# Patient Record
Sex: FEMALE | Race: WHITE | NOT HISPANIC OR LATINO | Employment: OTHER | URBAN - METROPOLITAN AREA
[De-identification: names, ages, dates, MRNs, and addresses within clinical notes are randomized per-mention and may not be internally consistent; named-entity substitution may affect disease eponyms.]

---

## 2022-09-06 LAB — CRC RECOMMENDATION EXT: NORMAL

## 2023-09-06 ENCOUNTER — DOCUMENTATION ONLY (OUTPATIENT)
Dept: FAMILY MEDICINE | Facility: CLINIC | Age: 63
End: 2023-09-06

## 2023-09-06 LAB
CHOLESTEROL, TOTAL: 181
HDLC SERPL-MCNC: 62 MG/DL
LDLC SERPL CALC-MCNC: 96 MG/DL (ref 0–160)
TRIGL SERPL-MCNC: 130 MG/DL (ref 40–160)
VLDLC SERPL-MCNC: 23 MG/DL

## 2023-10-31 ENCOUNTER — TELEPHONE (OUTPATIENT)
Dept: FAMILY MEDICINE | Facility: CLINIC | Age: 63
End: 2023-10-31
Payer: COMMERCIAL

## 2023-10-31 NOTE — TELEPHONE ENCOUNTER
Informed patient of lab results and when her next lab work and visit with the doctor is. Patient verbalized understanding.

## 2023-11-11 ENCOUNTER — DOCUMENTATION ONLY (OUTPATIENT)
Dept: PRIMARY CARE CLINIC | Facility: CLINIC | Age: 63
End: 2023-11-11
Payer: COMMERCIAL

## 2024-01-23 LAB — BCS RECOMMENDATION EXT: NORMAL

## 2024-06-12 ENCOUNTER — TELEPHONE (OUTPATIENT)
Dept: FAMILY MEDICINE | Facility: CLINIC | Age: 64
End: 2024-06-12
Payer: COMMERCIAL

## 2024-06-12 RX ORDER — CALCIUM CARBONATE 500(1250)
1 TABLET ORAL 2 TIMES DAILY
COMMUNITY

## 2024-06-12 RX ORDER — ROSUVASTATIN CALCIUM 20 MG/1
20 TABLET, COATED ORAL NIGHTLY
COMMUNITY
Start: 2024-04-05

## 2024-06-12 NOTE — TELEPHONE ENCOUNTER
Patient notified to stop her Vit D due to elevated level on her lab results. Instructed to keep her appointment on 6/18/2024 @ 1:40 . Patient verbalized an understanding.

## 2024-06-12 NOTE — TELEPHONE ENCOUNTER
----- Message from Gordo Ibanez MD sent at 6/12/2024  8:32 AM CDT -----   Very elevated vitamin-D.  Please ask patient to stop supplementing vitamin-D if she is doing so.  Otherwise, no acute concerns.  We will  discuss lab results at upcoming appointment

## 2024-06-18 ENCOUNTER — OFFICE VISIT (OUTPATIENT)
Dept: FAMILY MEDICINE | Facility: CLINIC | Age: 64
End: 2024-06-18
Payer: COMMERCIAL

## 2024-06-18 VITALS
RESPIRATION RATE: 18 BRPM | SYSTOLIC BLOOD PRESSURE: 132 MMHG | DIASTOLIC BLOOD PRESSURE: 82 MMHG | TEMPERATURE: 98 F | OXYGEN SATURATION: 97 % | WEIGHT: 165.81 LBS

## 2024-06-18 DIAGNOSIS — Z13.6 ENCOUNTER FOR SCREENING FOR CARDIOVASCULAR DISORDERS: ICD-10-CM

## 2024-06-18 DIAGNOSIS — Z13.89 SCREENING FOR CARDIOVASCULAR, RESPIRATORY, AND GENITOURINARY DISEASES: ICD-10-CM

## 2024-06-18 DIAGNOSIS — M81.0 OSTEOPOROSIS, UNSPECIFIED OSTEOPOROSIS TYPE, UNSPECIFIED PATHOLOGICAL FRACTURE PRESENCE: ICD-10-CM

## 2024-06-18 DIAGNOSIS — Z78.0 POST-MENOPAUSAL: Primary | ICD-10-CM

## 2024-06-18 DIAGNOSIS — Z79.899 ENCOUNTER FOR LONG-TERM CURRENT USE OF MEDICATION: ICD-10-CM

## 2024-06-18 DIAGNOSIS — E78.5 HYPERLIPIDEMIA, UNSPECIFIED HYPERLIPIDEMIA TYPE: ICD-10-CM

## 2024-06-18 DIAGNOSIS — Z13.83 SCREENING FOR CARDIOVASCULAR, RESPIRATORY, AND GENITOURINARY DISEASES: ICD-10-CM

## 2024-06-18 DIAGNOSIS — Z13.6 SCREENING FOR CARDIOVASCULAR, RESPIRATORY, AND GENITOURINARY DISEASES: ICD-10-CM

## 2024-06-18 DIAGNOSIS — E78.2 MIXED HYPERLIPIDEMIA: ICD-10-CM

## 2024-06-18 DIAGNOSIS — E55.9 VITAMIN D DEFICIENCY: ICD-10-CM

## 2024-06-18 PROCEDURE — 99214 OFFICE O/P EST MOD 30 MIN: CPT | Mod: ,,,

## 2024-06-18 PROCEDURE — 3075F SYST BP GE 130 - 139MM HG: CPT | Mod: CPTII,,,

## 2024-06-18 PROCEDURE — 1159F MED LIST DOCD IN RCRD: CPT | Mod: CPTII,,,

## 2024-06-18 PROCEDURE — 3079F DIAST BP 80-89 MM HG: CPT | Mod: CPTII,,,

## 2024-06-18 PROCEDURE — 1160F RVW MEDS BY RX/DR IN RCRD: CPT | Mod: CPTII,,,

## 2024-06-18 RX ORDER — DENOSUMAB 60 MG/ML
60 INJECTION SUBCUTANEOUS
COMMUNITY

## 2024-06-18 NOTE — ASSESSMENT & PLAN NOTE
-stable   -currently rosuvastatin 20 mg daily, continue   -does have extensive family history, order  CT calcium score   -low-cholesterol diet

## 2024-06-18 NOTE — PROGRESS NOTES
Patient ID: Rica Bush is a 63 y.o. female.    Chief Complaint: Results      Rica Bush is a 63 y.o. female, known to Dr. Olivera, presents today for a routine follow-up visit.  Medical comorbidities include hyperlipidemia.  Since last visit patient reports he has been fairly well without acute injury or major illness.  Currently on rosuvastatin for hyperlipidemia and currently well managed.Age-appropriate screenings age-appropriate screenings reviewed, due for mammogram and shingles vaccine.  Otherwise feeling generally well without acute complaints today.    DR constance lees    MEDICAL HISTORY:    Past Medical History:   Diagnosis Date    Elevated blood pressure reading     Elevated LFTs     Mixed hyperlipidemia     Osteopenia     Improved with Prolia 2023    Osteoporosis     Lumbar spine 2019    Right wrist fracture     Vitamin D deficiency       Past Surgical History:   Procedure Laterality Date    OVARIAN CYST REMOVAL        Social History     Tobacco Use    Smoking status: Never    Smokeless tobacco: Never   Substance Use Topics    Drug use: Never          Health Maintenance Due   Topic Date Due    Hepatitis C Screening  Never done    HIV Screening  Never done    TETANUS VACCINE  Never done    Mammogram  Never done    Shingles Vaccine (1 of 2) Never done    RSV Vaccine (Age 60+ and Pregnant patients) (1 - 1-dose 60+ series) Never done    COVID-19 Vaccine (3 - 2023-24 season) 09/01/2023          Patient Care Team:  Galileo Olivera Sr., MD as PCP - General (Family Medicine)  Nelson Nelson MD (Hematology and Oncology)  Radha Lees MD (Obstetrics and Gynecology)      Review of Systems   Constitutional:  Negative for activity change, fatigue, fever and unexpected weight change.   HENT:  Negative for congestion, hearing loss, rhinorrhea, sore throat and trouble swallowing.    Eyes:  Negative for discharge, redness and visual disturbance.   Respiratory:  Negative for cough, chest tightness,  shortness of breath and wheezing.    Cardiovascular:  Negative for chest pain and palpitations.   Gastrointestinal:  Negative for abdominal pain, blood in stool, constipation, diarrhea, nausea and vomiting.   Endocrine: Negative for polydipsia and polyuria.   Genitourinary:  Negative for difficulty urinating, dysuria, flank pain, frequency, hematuria, menstrual problem and urgency.   Musculoskeletal:  Negative for arthralgias, gait problem, joint swelling, myalgias and neck pain.   Skin:  Negative for rash and wound.   Neurological:  Negative for facial asymmetry, speech difficulty, weakness and headaches.   Psychiatric/Behavioral:  Negative for confusion and dysphoric mood.    All other systems reviewed and are negative.      Objective:   /82 (BP Location: Left arm, Patient Position: Sitting, BP Method: Medium (Manual))   Temp 98 °F (36.7 °C) (Temporal)   Resp 18   Wt 75.2 kg (165 lb 12.8 oz)   SpO2 97%      Physical Exam  Constitutional:       General: She is not in acute distress.     Appearance: Normal appearance.   HENT:      Right Ear: Tympanic membrane, ear canal and external ear normal.      Left Ear: Tympanic membrane, ear canal and external ear normal.      Nose: Nose normal.      Mouth/Throat:      Mouth: Mucous membranes are moist.      Pharynx: Oropharynx is clear.   Eyes:      Extraocular Movements: Extraocular movements intact.      Conjunctiva/sclera: Conjunctivae normal.      Pupils: Pupils are equal, round, and reactive to light.   Cardiovascular:      Rate and Rhythm: Normal rate and regular rhythm.      Pulses: Normal pulses.      Heart sounds: Normal heart sounds. No murmur heard.     No gallop.   Pulmonary:      Effort: Pulmonary effort is normal.      Breath sounds: Normal breath sounds. No wheezing.   Abdominal:      General: Bowel sounds are normal. There is no distension.      Palpations: Abdomen is soft. There is no mass.      Tenderness: There is no abdominal tenderness. There  is no guarding.   Musculoskeletal:         General: Normal range of motion.   Skin:     General: Skin is warm and dry.   Neurological:      Mental Status: She is alert. Mental status is at baseline.      Sensory: No sensory deficit.      Motor: No weakness.           Assessment:       ICD-10-CM ICD-9-CM   1. Post-menopausal  Z78.0 V49.81   2. Screening for cardiovascular, respiratory, and genitourinary diseases  Z13.6 V81.2    Z13.89 V81.6    Z13.83 V81.4   3. Encounter for long-term current use of medication  Z79.899 V58.69   4. Hyperlipidemia, unspecified hyperlipidemia type  E78.5 272.4   5. Encounter for screening for cardiovascular disorders  Z13.6 V81.2        Plan:     Problem List Items Addressed This Visit    None  Visit Diagnoses       Post-menopausal    -  Primary    Relevant Orders    TSH    Lipid Panel    Comprehensive Metabolic Panel    CBC Auto Differential    Vitamin D    Screening for cardiovascular, respiratory, and genitourinary diseases        Relevant Orders    TSH    Lipid Panel    Comprehensive Metabolic Panel    CBC Auto Differential    Vitamin D    CT Calcium Scoring Cardiac    Encounter for long-term current use of medication        Relevant Orders    TSH    Lipid Panel    Comprehensive Metabolic Panel    CBC Auto Differential    Vitamin D    Hyperlipidemia, unspecified hyperlipidemia type        Relevant Orders    TSH    Lipid Panel    Comprehensive Metabolic Panel    CBC Auto Differential    Vitamin D    CT Calcium Scoring Cardiac    Encounter for screening for cardiovascular disorders        Relevant Orders    CT Calcium Scoring Cardiac               Follow up in about 1 year (around 6/18/2025) for General Checkup, with labs prior.   -plan specifics discussed above    Orders Placed This Encounter    CT Calcium Scoring Cardiac    TSH    Lipid Panel    Comprehensive Metabolic Panel    CBC Auto Differential    Vitamin D        Medication List with Changes/Refills   Current Medications     CALCIUM CARBONATE (OS-ROSAS) 500 MG CALCIUM (1,250 MG) TABLET    Take 1 tablet by mouth 2 (two) times daily.    DENOSUMAB (PROLIA) 60 MG/ML SYRG    Inject 60 mg into the skin every 6 (six) months.    ROSUVASTATIN (CRESTOR) 20 MG TABLET    Take 20 mg by mouth every evening.

## 2024-06-18 NOTE — ASSESSMENT & PLAN NOTE
-elevated vitamin-D level noted   -currently utilizing OTC vitamin-D as well as calcium/vitamin-D preparation for osteoporosis, and multivitamin with vitamin-D; encouraged to decrease individual vitamin-D supplement.    -patient aware if again elevated may need to do away with individual vitamin-D supplement altogether since having appropriate vitamin-D supplementation with other OTC multi supplements

## 2024-06-19 ENCOUNTER — DOCUMENTATION ONLY (OUTPATIENT)
Dept: FAMILY MEDICINE | Facility: CLINIC | Age: 64
End: 2024-06-19
Payer: COMMERCIAL

## 2024-07-08 DIAGNOSIS — E78.5 HYPERLIPIDEMIA, UNSPECIFIED HYPERLIPIDEMIA TYPE: Primary | ICD-10-CM

## 2024-07-08 RX ORDER — ROSUVASTATIN CALCIUM 20 MG/1
20 TABLET, COATED ORAL NIGHTLY
Qty: 90 TABLET | Refills: 1 | Status: SHIPPED | OUTPATIENT
Start: 2024-07-08

## 2024-07-31 ENCOUNTER — TELEPHONE (OUTPATIENT)
Dept: FAMILY MEDICINE | Facility: CLINIC | Age: 64
End: 2024-07-31
Payer: COMMERCIAL

## 2024-07-31 DIAGNOSIS — Z82.49 FAMILY HISTORY OF HEART DISEASE: ICD-10-CM

## 2024-07-31 DIAGNOSIS — Z13.6 ENCOUNTER FOR SCREENING FOR CARDIOVASCULAR DISORDERS: ICD-10-CM

## 2024-07-31 DIAGNOSIS — E78.5 HYPERLIPIDEMIA, UNSPECIFIED HYPERLIPIDEMIA TYPE: ICD-10-CM

## 2024-07-31 DIAGNOSIS — E78.2 MIXED HYPERLIPIDEMIA: Primary | Chronic | ICD-10-CM

## 2024-07-31 DIAGNOSIS — R93.89 ABNORMAL CT SCAN: Primary | ICD-10-CM

## 2024-07-31 NOTE — TELEPHONE ENCOUNTER
Patient notified of her CT Calcium Results, She has a strong family history of heart diease and would like a referral to Dr Austin Sarmiento. Referral sent.Patient verbalized an understanding.   Charge rn

## 2024-07-31 NOTE — TELEPHONE ENCOUNTER
----- Message from Kimmy Harris sent at 7/31/2024  9:03 AM CDT -----  She did her Calcium CT Score and it said she has a moderate chance of having a heart attack and she wants to know what her next step is

## 2024-07-31 NOTE — TELEPHONE ENCOUNTER
Called patient and notified her of her CT Calcium score result. She would like a referral to Dr Austin Sarmiento, First additional lab need to be done then the referral can be sent.Patient is scheduled on 8/2/2024 for additional lab. Patient verbalized an understanding.

## 2024-08-01 PROCEDURE — 83700 LIPOPRO BLD ELECTROPHORETIC: CPT | Performed by: FAMILY MEDICINE

## 2024-08-01 PROCEDURE — 82172 ASSAY OF APOLIPOPROTEIN: CPT | Performed by: FAMILY MEDICINE

## 2025-01-02 DIAGNOSIS — E78.5 HYPERLIPIDEMIA, UNSPECIFIED HYPERLIPIDEMIA TYPE: ICD-10-CM

## 2025-01-02 RX ORDER — ROSUVASTATIN CALCIUM 20 MG/1
20 TABLET, COATED ORAL NIGHTLY
Qty: 90 TABLET | Refills: 1 | Status: SHIPPED | OUTPATIENT
Start: 2025-01-02

## 2025-01-02 RX ORDER — ROSUVASTATIN CALCIUM 20 MG/1
20 TABLET, COATED ORAL NIGHTLY
Qty: 90 TABLET | Refills: 1 | Status: SHIPPED | OUTPATIENT
Start: 2025-01-02 | End: 2025-01-02 | Stop reason: SDUPTHER